# Patient Record
Sex: FEMALE | Race: OTHER | HISPANIC OR LATINO
[De-identification: names, ages, dates, MRNs, and addresses within clinical notes are randomized per-mention and may not be internally consistent; named-entity substitution may affect disease eponyms.]

---

## 2017-04-21 ENCOUNTER — APPOINTMENT (OUTPATIENT)
Dept: OBGYN | Facility: CLINIC | Age: 52
End: 2017-04-21

## 2017-04-21 VITALS
WEIGHT: 147 LBS | HEIGHT: 60 IN | SYSTOLIC BLOOD PRESSURE: 120 MMHG | DIASTOLIC BLOOD PRESSURE: 80 MMHG | BODY MASS INDEX: 28.86 KG/M2

## 2017-04-21 DIAGNOSIS — N76.0 ACUTE VAGINITIS: ICD-10-CM

## 2017-04-21 DIAGNOSIS — B96.89 ACUTE VAGINITIS: ICD-10-CM

## 2017-04-21 RX ORDER — BLOOD-GLUCOSE METER
W/DEVICE KIT MISCELLANEOUS
Qty: 1 | Refills: 0 | Status: ACTIVE | COMMUNITY
Start: 2017-02-13

## 2017-04-21 RX ORDER — BLOOD SUGAR DIAGNOSTIC
STRIP MISCELLANEOUS
Qty: 100 | Refills: 0 | Status: ACTIVE | COMMUNITY
Start: 2016-12-14

## 2017-04-24 ENCOUNTER — APPOINTMENT (OUTPATIENT)
Dept: OBGYN | Facility: CLINIC | Age: 52
End: 2017-04-24

## 2017-04-24 ENCOUNTER — RESULT REVIEW (OUTPATIENT)
Age: 52
End: 2017-04-24

## 2017-04-24 DIAGNOSIS — R30.9 PAINFUL MICTURITION, UNSPECIFIED: ICD-10-CM

## 2017-04-24 LAB
CANDIDA VAG CYTO: NOT DETECTED
G VAGINALIS+PREV SP MTYP VAG QL MICRO: DETECTED
T VAGINALIS VAG QL WET PREP: NOT DETECTED

## 2017-04-26 ENCOUNTER — RESULT REVIEW (OUTPATIENT)
Age: 52
End: 2017-04-26

## 2017-04-27 LAB
BACTERIA UR CULT: ABNORMAL
CYTOLOGY CVX/VAG DOC THIN PREP: NORMAL

## 2017-08-11 ENCOUNTER — LABORATORY RESULT (OUTPATIENT)
Age: 52
End: 2017-08-11

## 2017-08-14 LAB
HBV SURFACE AB SER QL: NONREACTIVE
HCV AB SER QL: REACTIVE
HCV S/CO RATIO: 13.88 S/CO
HIV1+2 AB SPEC QL IA.RAPID: NONREACTIVE
T PALLIDUM AB SER QL IA: NEGATIVE

## 2017-10-02 ENCOUNTER — APPOINTMENT (OUTPATIENT)
Dept: OBGYN | Facility: CLINIC | Age: 52
End: 2017-10-02

## 2017-11-27 ENCOUNTER — APPOINTMENT (OUTPATIENT)
Dept: OBGYN | Facility: CLINIC | Age: 52
End: 2017-11-27
Payer: COMMERCIAL

## 2017-11-27 DIAGNOSIS — Z11.3 ENCOUNTER FOR SCREENING FOR INFECTIONS WITH A PREDOMINANTLY SEXUAL MODE OF TRANSMISSION: ICD-10-CM

## 2017-11-27 DIAGNOSIS — Z87.891 PERSONAL HISTORY OF NICOTINE DEPENDENCE: ICD-10-CM

## 2017-11-27 DIAGNOSIS — Z00.00 ENCOUNTER FOR GENERAL ADULT MEDICAL EXAMINATION W/OUT ABNORMAL FINDINGS: ICD-10-CM

## 2017-11-27 DIAGNOSIS — F17.200 NICOTINE DEPENDENCE, UNSPECIFIED, UNCOMPLICATED: ICD-10-CM

## 2017-11-27 PROCEDURE — 99213 OFFICE O/P EST LOW 20 MIN: CPT

## 2017-11-27 RX ORDER — METRONIDAZOLE 7.5 MG/G
0.75 GEL VAGINAL
Qty: 1 | Refills: 0 | Status: DISCONTINUED | COMMUNITY
Start: 2017-04-24 | End: 2017-11-27

## 2017-11-27 RX ORDER — CEPHALEXIN 250 MG/1
250 TABLET ORAL TWICE DAILY
Qty: 14 | Refills: 0 | Status: DISCONTINUED | COMMUNITY
Start: 2017-04-26 | End: 2017-11-27

## 2017-11-28 LAB
C TRACH RRNA SPEC QL NAA+PROBE: NOT DETECTED
N GONORRHOEA RRNA SPEC QL NAA+PROBE: NOT DETECTED
SOURCE AMPLIFICATION: NORMAL

## 2017-11-30 LAB
CANDIDA VAG CYTO: NOT DETECTED
G VAGINALIS+PREV SP MTYP VAG QL MICRO: NOT DETECTED
T VAGINALIS VAG QL WET PREP: NOT DETECTED

## 2018-01-17 ENCOUNTER — APPOINTMENT (OUTPATIENT)
Dept: OBGYN | Facility: CLINIC | Age: 53
End: 2018-01-17
Payer: COMMERCIAL

## 2018-01-17 ENCOUNTER — LABORATORY RESULT (OUTPATIENT)
Age: 53
End: 2018-01-17

## 2018-01-17 VITALS — DIASTOLIC BLOOD PRESSURE: 70 MMHG | SYSTOLIC BLOOD PRESSURE: 110 MMHG

## 2018-01-17 DIAGNOSIS — L29.2 PRURITUS VULVAE: ICD-10-CM

## 2018-01-17 PROCEDURE — 99213 OFFICE O/P EST LOW 20 MIN: CPT

## 2018-01-18 ENCOUNTER — MEDICATION RENEWAL (OUTPATIENT)
Age: 53
End: 2018-01-18

## 2018-07-02 ENCOUNTER — APPOINTMENT (OUTPATIENT)
Dept: INTERNAL MEDICINE | Facility: CLINIC | Age: 53
End: 2018-07-02
Payer: COMMERCIAL

## 2018-07-02 VITALS — WEIGHT: 151 LBS | BODY MASS INDEX: 29.49 KG/M2

## 2018-07-02 VITALS — SYSTOLIC BLOOD PRESSURE: 128 MMHG | DIASTOLIC BLOOD PRESSURE: 66 MMHG | RESPIRATION RATE: 12 BRPM | HEART RATE: 74 BPM

## 2018-07-02 DIAGNOSIS — R49.0 DYSPHONIA: ICD-10-CM

## 2018-07-02 PROCEDURE — 99204 OFFICE O/P NEW MOD 45 MIN: CPT

## 2018-07-02 RX ORDER — CEFUROXIME AXETIL 500 MG/1
500 TABLET ORAL
Qty: 14 | Refills: 0 | Status: COMPLETED | COMMUNITY
Start: 2018-01-10

## 2018-07-02 RX ORDER — FLUCONAZOLE 150 MG/1
150 TABLET ORAL
Qty: 2 | Refills: 1 | Status: DISCONTINUED | COMMUNITY
Start: 2018-01-17 | End: 2018-07-02

## 2018-07-02 RX ORDER — BUPROPION HYDROCHLORIDE 150 MG/1
150 TABLET, EXTENDED RELEASE ORAL
Qty: 30 | Refills: 0 | Status: COMPLETED | COMMUNITY
Start: 2018-05-07

## 2018-07-02 RX ORDER — PNV NO.95/FERROUS FUM/FOLIC AC 28MG-0.8MG
TABLET ORAL DAILY
Qty: 30 | Refills: 11 | Status: ACTIVE | COMMUNITY
Start: 2018-07-02

## 2018-07-02 RX ORDER — NYSTATIN AND TRIAMCINOLONE ACETONIDE 100000; 1 MG/G; MG/G
100000-0.1 CREAM TOPICAL TWICE DAILY
Qty: 1 | Refills: 0 | Status: DISCONTINUED | COMMUNITY
Start: 2017-11-27 | End: 2018-07-02

## 2018-07-02 RX ORDER — BUPROPION HYDROCHLORIDE 300 MG/1
300 TABLET, EXTENDED RELEASE ORAL
Qty: 30 | Refills: 0 | Status: ACTIVE | COMMUNITY
Start: 2018-05-21

## 2018-07-02 NOTE — ASSESSMENT
[FreeTextEntry1] : Rhinitis with hoarse voice and L eye disch.  Rec Claritin or Zyrtec and not eye drops.  \par Hep C.  s/p Mavyret.  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  However, she still states that it was an "unclear OR" when she had a C Section in 1988.  \par Smokes,  Now on Chantix.  Says she now gets nausea on Chantix every time she smokes a ciggerette.  Says her last smoking day will be today or tomorrow. \par Psych hx.  On Abilify 2 and Bupropion 300\par DM HBAiC 6.5\par Considering plastic surg for redundant tissue on arms and abd after 100 lb wt loss.

## 2018-07-02 NOTE — HISTORY OF PRESENT ILLNESS
[FreeTextEntry1] : New pt eval c/o eye disch.  SMokes.  Now on Chantix.  Treated for Hep C with Mavyret.  Says "clear since 7-1990"

## 2018-07-02 NOTE — PHYSICAL EXAM

## 2018-07-02 NOTE — HEALTH RISK ASSESSMENT
[Good] : ~his/her~  mood as  good [No falls in past year] : Patient reported no falls in the past year [Patient reported colonoscopy was normal] : Patient reported colonoscopy was normal [None] : None [With Family] : lives with family [# of Members in Household ___] :  household currently consist of [unfilled] member(s) [Single] : single [Feels Safe at Home] : Feels safe at home [Fully functional (bathing, dressing, toileting, transferring, walking, feeding)] : Fully functional (bathing, dressing, toileting, transferring, walking, feeding) [Fully functional (using the telephone, shopping, preparing meals, housekeeping, doing laundry, using] : Fully functional and needs no help or supervision to perform IADLs (using the telephone, shopping, preparing meals, housekeeping, doing laundry, using transportation, managing medications and managing finances) [Smoke Detector] : smoke detector [Carbon Monoxide Detector] : carbon monoxide detector [Safety elements used in home] : safety elements used in home [Seat Belt] :  uses seat belt [] : No [de-identified] : Former IVDA [Change in mental status noted] : No change in mental status noted [Language] : denies difficulty with language [Behavior] : denies difficulty with behavior [Learning/Retaining New Information] : denies difficulty learning/retaining new information [Handling Complex Tasks] : denies difficulty handling complex tasks [Reasoning] : denies difficulty with reasoning [Spatial Ability and Orientation] : denies difficulty with spatial ability and orientation [Reports changes in hearing] : Reports no changes in hearing [Reports changes in vision] : Reports no changes in vision [Reports changes in dental health] : Reports no changes in dental health [MammogramComments] : Ordered [PapSmearDate] : 1/2018 [ColonoscopyDate] : 7/2013 [ColonoscopyComments] : Had benign polyp removed and rec 10 year f/up.   [HepatitisCComments] : Known positive.  [de-identified] : Lives with son and dtr

## 2018-07-13 ENCOUNTER — RX RENEWAL (OUTPATIENT)
Age: 53
End: 2018-07-13

## 2018-12-20 ENCOUNTER — APPOINTMENT (OUTPATIENT)
Dept: INTERNAL MEDICINE | Facility: CLINIC | Age: 53
End: 2018-12-20
Payer: COMMERCIAL

## 2018-12-20 VITALS — RESPIRATION RATE: 12 BRPM | HEART RATE: 68 BPM | SYSTOLIC BLOOD PRESSURE: 114 MMHG | DIASTOLIC BLOOD PRESSURE: 64 MMHG

## 2018-12-20 VITALS — DIASTOLIC BLOOD PRESSURE: 58 MMHG | SYSTOLIC BLOOD PRESSURE: 94 MMHG

## 2018-12-20 VITALS — HEIGHT: 60 IN | WEIGHT: 157 LBS | BODY MASS INDEX: 30.82 KG/M2

## 2018-12-20 DIAGNOSIS — E78.00 PURE HYPERCHOLESTEROLEMIA, UNSPECIFIED: ICD-10-CM

## 2018-12-20 DIAGNOSIS — J31.0 CHRONIC RHINITIS: ICD-10-CM

## 2018-12-20 DIAGNOSIS — Z23 ENCOUNTER FOR IMMUNIZATION: ICD-10-CM

## 2018-12-20 DIAGNOSIS — E11.9 TYPE 2 DIABETES MELLITUS W/OUT COMPLICATIONS: ICD-10-CM

## 2018-12-20 DIAGNOSIS — R07.89 OTHER CHEST PAIN: ICD-10-CM

## 2018-12-20 DIAGNOSIS — K73.2 CHRONIC ACTIVE HEPATITIS, NOT ELSEWHERE CLASSIFIED: ICD-10-CM

## 2018-12-20 DIAGNOSIS — F99 MENTAL DISORDER, NOT OTHERWISE SPECIFIED: ICD-10-CM

## 2018-12-20 DIAGNOSIS — F17.200 NICOTINE DEPENDENCE, UNSPECIFIED, UNCOMPLICATED: ICD-10-CM

## 2018-12-20 PROCEDURE — G0008: CPT

## 2018-12-20 PROCEDURE — 90674 CCIIV4 VAC NO PRSV 0.5 ML IM: CPT

## 2018-12-20 PROCEDURE — 99214 OFFICE O/P EST MOD 30 MIN: CPT | Mod: 25

## 2018-12-20 RX ORDER — GLECAPREVIR AND PIBRENTASVIR 40; 100 MG/1; MG/1
100-40 TABLET, FILM COATED ORAL DAILY
Qty: 30 | Refills: 0 | Status: DISCONTINUED | COMMUNITY
Start: 2018-07-02 | End: 2018-12-20

## 2018-12-20 RX ORDER — VARENICLINE TARTRATE 0.5 (11)-1
0.5 MG X 11 & KIT ORAL
Qty: 53 | Refills: 0 | Status: DISCONTINUED | COMMUNITY
Start: 2018-02-15 | End: 2018-12-20

## 2018-12-20 RX ORDER — ACETAMINOPHEN 325 MG
5000 TABLET ORAL
Qty: 30 | Refills: 0 | Status: DISCONTINUED | COMMUNITY
Start: 2018-07-02 | End: 2018-12-20

## 2018-12-20 RX ORDER — TINIDAZOLE 500 MG/1
500 TABLET, FILM COATED ORAL DAILY
Qty: 8 | Refills: 0 | Status: DISCONTINUED | COMMUNITY
Start: 2018-01-18 | End: 2018-12-20

## 2018-12-20 RX ORDER — ESCITALOPRAM OXALATE 10 MG/1
10 TABLET ORAL DAILY
Qty: 90 | Refills: 3 | Status: ACTIVE | COMMUNITY
Start: 2018-12-20

## 2018-12-20 RX ORDER — ARIPIPRAZOLE 2 MG/1
2 TABLET ORAL DAILY
Qty: 30 | Refills: 0 | Status: DISCONTINUED | COMMUNITY
Start: 2018-07-02 | End: 2018-12-20

## 2018-12-20 RX ORDER — VARENICLINE TARTRATE 1 MG/1
1 TABLET, FILM COATED ORAL
Qty: 56 | Refills: 0 | Status: DISCONTINUED | COMMUNITY
Start: 2018-01-16 | End: 2018-12-20

## 2018-12-20 NOTE — HISTORY OF PRESENT ILLNESS
[FreeTextEntry1] : c/o fatigue.  "I feel air headed"  Occas "bad pain in my chest"  "just comes"  Non exertional.  Some neck strain.  Some headache.  Off Abilify\par Finished Hep C treatment.  \par F/up for DM.  Not using Victoza. \par Had labs at [facility].  A1C 6.6  Chol 297  \par Smokes

## 2018-12-20 NOTE — HEALTH RISK ASSESSMENT
[No falls in past year] : Patient reported no falls in the past year [2] : 2) Feeling down, depressed, or hopeless for more than half of the days (2) [] : No [[id number]] : 4

## 2018-12-20 NOTE — PHYSICAL EXAM
[No Acute Distress] : no acute distress [Well Nourished] : well nourished [Well Developed] : well developed [Well-Appearing] : well-appearing [Normal Sclera/Conjunctiva] : normal sclera/conjunctiva [PERRL] : pupils equal round and reactive to light [EOMI] : extraocular movements intact [Normal Outer Ear/Nose] : the outer ears and nose were normal in appearance [Normal Oropharynx] : the oropharynx was normal [Normal TMs] : both tympanic membranes were normal [No JVD] : no jugular venous distention [Supple] : supple [No Lymphadenopathy] : no lymphadenopathy [Thyroid Normal, No Nodules] : the thyroid was normal and there were no nodules present [No Respiratory Distress] : no respiratory distress  [Clear to Auscultation] : lungs were clear to auscultation bilaterally [No Accessory Muscle Use] : no accessory muscle use [Normal Rate] : normal rate  [Regular Rhythm] : with a regular rhythm [Normal S1, S2] : normal S1 and S2 [No Murmur] : no murmur heard [No Carotid Bruits] : no carotid bruits [No Abdominal Bruit] : a ~M bruit was not heard ~T in the abdomen [No Varicosities] : no varicosities [Pedal Pulses Present] : the pedal pulses are present [No Edema] : there was no peripheral edema [No Extremity Clubbing/Cyanosis] : no extremity clubbing/cyanosis [No Palpable Aorta] : no palpable aorta [Soft] : abdomen soft [Non Tender] : non-tender [Non-distended] : non-distended [No Masses] : no abdominal mass palpated [No HSM] : no HSM [Normal Bowel Sounds] : normal bowel sounds [Normal Supraclavicular Nodes] : no supraclavicular lymphadenopathy [Normal Posterior Cervical Nodes] : no posterior cervical lymphadenopathy [Normal Anterior Cervical Nodes] : no anterior cervical lymphadenopathy [No CVA Tenderness] : no CVA  tenderness [No Spinal Tenderness] : no spinal tenderness [No Joint Swelling] : no joint swelling [Grossly Normal Strength/Tone] : grossly normal strength/tone [No Rash] : no rash [Normal Gait] : normal gait [Coordination Grossly Intact] : coordination grossly intact [No Focal Deficits] : no focal deficits [Deep Tendon Reflexes (DTR)] : deep tendon reflexes were 2+ and symmetric [Normal Affect] : the affect was normal [Alert and Oriented x3] : oriented to person, place, and time [Normal Insight/Judgement] : insight and judgment were intact [Comprehensive Foot Exam Normal] : Right and left foot were examined and both feet are normal. No ulcers in either foot. Toes are normal and with full ROM.  Normal tactile sensation with monofilament testing throughout both feet

## 2018-12-20 NOTE — ASSESSMENT
[FreeTextEntry1] : Rhinitis with hoarse voice ContClaritin or Zyrtec prn\par Hep C.  s/p Mavyret.  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  However, she still states that it was an "unclear OR" when she had a C Section in 1988.  Hep C RNA now neg.\par Smokes, Tried Chantix.  Now on Bupropion\par Psych hx.  Off Abilify 2 and Bupropion 300.  gets weekly Tx.\par DM HBAiC 6.6\par Chol 297 .  Needs Atorvastatin..  \par CP headache fatigue neck pain all are due to depression.

## 2018-12-20 NOTE — REVIEW OF SYSTEMS
[Fatigue] : fatigue [Chest Pain] : chest pain [Headache] : headache [Depression] : depression [Negative] : Heme/Lymph [Palpitations] : no palpitations [Lower Ext Edema] : no lower extremity edema [Shortness Of Breath] : no shortness of breath [Insomnia] : no insomnia [FreeTextEntry2] : see HPI

## 2019-04-12 ENCOUNTER — RX RENEWAL (OUTPATIENT)
Age: 54
End: 2019-04-12

## 2019-04-16 ENCOUNTER — RX RENEWAL (OUTPATIENT)
Age: 54
End: 2019-04-16

## 2019-04-19 ENCOUNTER — APPOINTMENT (OUTPATIENT)
Dept: INTERNAL MEDICINE | Facility: CLINIC | Age: 54
End: 2019-04-19

## 2019-05-11 ENCOUNTER — RX CHANGE (OUTPATIENT)
Age: 54
End: 2019-05-11

## 2019-05-11 RX ORDER — ATORVASTATIN CALCIUM 10 MG/1
10 TABLET, FILM COATED ORAL DAILY
Qty: 90 | Refills: 3 | Status: DISCONTINUED | COMMUNITY
Start: 2018-12-20 | End: 2019-05-11

## 2019-06-15 ENCOUNTER — RX CHANGE (OUTPATIENT)
Age: 54
End: 2019-06-15

## 2019-06-15 RX ORDER — ATORVASTATIN CALCIUM 10 MG/1
10 TABLET, FILM COATED ORAL
Qty: 90 | Refills: 3 | Status: DISCONTINUED | COMMUNITY
Start: 2019-05-11 | End: 2019-06-15

## 2019-07-14 ENCOUNTER — RX RENEWAL (OUTPATIENT)
Age: 54
End: 2019-07-14

## 2019-07-15 ENCOUNTER — RX RENEWAL (OUTPATIENT)
Age: 54
End: 2019-07-15

## 2019-08-06 ENCOUNTER — RESULT REVIEW (OUTPATIENT)
Age: 54
End: 2019-08-06

## 2019-11-15 ENCOUNTER — APPOINTMENT (OUTPATIENT)
Dept: ULTRASOUND IMAGING | Facility: IMAGING CENTER | Age: 54
End: 2019-11-15

## 2019-11-22 ENCOUNTER — APPOINTMENT (OUTPATIENT)
Dept: ULTRASOUND IMAGING | Facility: IMAGING CENTER | Age: 54
End: 2019-11-22
Payer: COMMERCIAL

## 2019-11-22 ENCOUNTER — OUTPATIENT (OUTPATIENT)
Dept: OUTPATIENT SERVICES | Facility: HOSPITAL | Age: 54
LOS: 1 days | End: 2019-11-22
Payer: COMMERCIAL

## 2019-11-22 ENCOUNTER — RESULT REVIEW (OUTPATIENT)
Age: 54
End: 2019-11-22

## 2019-11-22 DIAGNOSIS — E04.9 NONTOXIC GOITER, UNSPECIFIED: ICD-10-CM

## 2019-11-22 PROCEDURE — 10005 FNA BX W/US GDN 1ST LES: CPT

## 2019-11-22 PROCEDURE — 88173 CYTOPATH EVAL FNA REPORT: CPT | Mod: 26

## 2019-11-22 PROCEDURE — 88172 CYTP DX EVAL FNA 1ST EA SITE: CPT

## 2019-11-22 PROCEDURE — 88173 CYTOPATH EVAL FNA REPORT: CPT

## 2020-01-10 ENCOUNTER — RX RENEWAL (OUTPATIENT)
Age: 55
End: 2020-01-10

## 2020-02-12 ENCOUNTER — RX RENEWAL (OUTPATIENT)
Age: 55
End: 2020-02-12

## 2020-02-13 ENCOUNTER — RX RENEWAL (OUTPATIENT)
Age: 55
End: 2020-02-13

## 2020-02-13 RX ORDER — ATORVASTATIN CALCIUM 10 MG/1
10 TABLET, FILM COATED ORAL DAILY
Qty: 90 | Refills: 3 | Status: ACTIVE | COMMUNITY
Start: 2019-06-15 | End: 1900-01-01

## 2020-04-27 ENCOUNTER — APPOINTMENT (OUTPATIENT)
Dept: INTERNAL MEDICINE | Facility: CLINIC | Age: 55
End: 2020-04-27

## 2020-05-12 ENCOUNTER — RX RENEWAL (OUTPATIENT)
Age: 55
End: 2020-05-12

## 2021-05-04 ENCOUNTER — NON-APPOINTMENT (OUTPATIENT)
Age: 56
End: 2021-05-04

## 2021-05-25 ENCOUNTER — APPOINTMENT (OUTPATIENT)
Dept: OBGYN | Facility: CLINIC | Age: 56
End: 2021-05-25
Payer: COMMERCIAL

## 2021-05-25 ENCOUNTER — NON-APPOINTMENT (OUTPATIENT)
Age: 56
End: 2021-05-25

## 2021-05-25 VITALS
HEIGHT: 61 IN | SYSTOLIC BLOOD PRESSURE: 110 MMHG | DIASTOLIC BLOOD PRESSURE: 70 MMHG | BODY MASS INDEX: 30.96 KG/M2 | WEIGHT: 164 LBS

## 2021-05-25 DIAGNOSIS — Z82.3 FAMILY HISTORY OF STROKE: ICD-10-CM

## 2021-05-25 DIAGNOSIS — Z01.419 ENCOUNTER FOR GYNECOLOGICAL EXAMINATION (GENERAL) (ROUTINE) W/OUT ABNORMAL FINDINGS: ICD-10-CM

## 2021-05-25 PROCEDURE — 99396 PREV VISIT EST AGE 40-64: CPT

## 2021-06-01 LAB — CYTOLOGY CVX/VAG DOC THIN PREP: ABNORMAL

## 2021-06-06 PROBLEM — Z82.3 FAMILY HISTORY OF CVA: Status: ACTIVE | Noted: 2021-06-06

## 2021-06-06 RX ORDER — LOSARTAN POTASSIUM 100 MG/1
TABLET, FILM COATED ORAL
Refills: 0 | Status: ACTIVE | COMMUNITY

## 2021-06-06 RX ORDER — LIRAGLUTIDE 6 MG/ML
18 INJECTION SUBCUTANEOUS
Qty: 9 | Refills: 0 | Status: DISCONTINUED | COMMUNITY
Start: 2018-04-02 | End: 2021-06-06

## 2021-06-06 RX ORDER — DULAGLUTIDE 4.5 MG/.5ML
INJECTION, SOLUTION SUBCUTANEOUS
Refills: 0 | Status: ACTIVE | COMMUNITY

## 2022-11-17 ENCOUNTER — NON-APPOINTMENT (OUTPATIENT)
Age: 57
End: 2022-11-17

## 2023-08-01 ENCOUNTER — APPOINTMENT (OUTPATIENT)
Dept: OBGYN | Facility: CLINIC | Age: 58
End: 2023-08-01

## 2024-02-22 ENCOUNTER — APPOINTMENT (OUTPATIENT)
Dept: ORTHOPEDIC SURGERY | Facility: CLINIC | Age: 59
End: 2024-02-22
Payer: COMMERCIAL

## 2024-02-22 VITALS — WEIGHT: 164 LBS | HEIGHT: 61 IN | BODY MASS INDEX: 30.96 KG/M2

## 2024-02-22 DIAGNOSIS — M47.812 SPONDYLOSIS W/OUT MYELOPATHY OR RADICULOPATHY, CERVICAL REGION: ICD-10-CM

## 2024-02-22 DIAGNOSIS — M47.814 SPONDYLOSIS W/OUT MYELOPATHY OR RADICULOPATHY, THORACIC REGION: ICD-10-CM

## 2024-02-22 DIAGNOSIS — M79.18 MYALGIA, OTHER SITE: ICD-10-CM

## 2024-02-22 PROCEDURE — 72070 X-RAY EXAM THORAC SPINE 2VWS: CPT

## 2024-02-22 PROCEDURE — 72040 X-RAY EXAM NECK SPINE 2-3 VW: CPT

## 2024-02-22 PROCEDURE — 99203 OFFICE O/P NEW LOW 30 MIN: CPT

## 2024-02-22 NOTE — PHYSICAL EXAM
[de-identified] : Constitutional: Well developed, well nourished, able to communicate Neuro: Normal sensation, No focal deficits Skin: Intact CV: Peripheral vascular exam grossly normal Pulm: No signs of respiratory distress Psych: Oriented, normal mood and affect

## 2024-02-22 NOTE — IMAGING
[de-identified] : Neck: - No obvious deformity, swelling, or bruising - No pain with palpation of spinous processes  - Pain to bilateral traps. No significant cervical paraspinal pain - Limited sidebending, rotation, and forward flexion with stiffness. Normal ROM for extension - 5/5 strength throughout UE evaluation bilaterally  - Distally neurovascularly intact  Back: - No obvious deformity - No pain with palpation of spinous processes or paraspinal musculature - ROM intact throughout flexion, extension, sidebending, and rotation - 5/5 strength throughout LE evaluation bilaterally - Distally neurovascularly intact  [Straightening consistent with spasm] : Straightening consistent with spasm [Facet arthropathy] : Facet arthropathy [N/A] : thoracic [Disc space narrowing] : Disc space narrowing

## 2024-02-22 NOTE — ASSESSMENT
[FreeTextEntry1] : neck and thoracic pain on and off for a decade, recently worsening due likely to work requirements. XR with degenerative changes. Muscle tightness on exam. No radicular findings - PT referral - Standing desk and ergonomic adjustments at work  - tylenol, naproxen prn - heat and ice as needed - Follow up in 6-8 weeks
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
## 2024-04-09 ENCOUNTER — APPOINTMENT (OUTPATIENT)
Dept: ORTHOPEDIC SURGERY | Facility: CLINIC | Age: 59
End: 2024-04-09